# Patient Record
Sex: FEMALE | URBAN - METROPOLITAN AREA
[De-identification: names, ages, dates, MRNs, and addresses within clinical notes are randomized per-mention and may not be internally consistent; named-entity substitution may affect disease eponyms.]

---

## 2021-04-29 ENCOUNTER — OFFICE VISIT (OUTPATIENT)
Dept: URBAN - METROPOLITAN AREA CLINIC 91 | Facility: CLINIC | Age: 46
End: 2021-04-29
Payer: COMMERCIAL

## 2021-04-29 DIAGNOSIS — H11.153 PINGUECULA, BILATERAL: Primary | ICD-10-CM

## 2021-04-29 DIAGNOSIS — H52.03 HYPERMETROPIA, BILATERAL: ICD-10-CM

## 2021-04-29 PROCEDURE — 99202 OFFICE O/P NEW SF 15 MIN: CPT | Performed by: SPECIALIST

## 2021-04-29 ASSESSMENT — INTRAOCULAR PRESSURE
OD: 15
OS: 15

## 2021-04-29 ASSESSMENT — VISUAL ACUITY
OS: 20/20
OD: 20/20

## 2021-04-29 NOTE — IMPRESSION/PLAN
Impression: Pinguecula, bilateral: H11.153. Plan: For Pinguecula I have recommended UV Protection when outdoors, either a hat or UV rated sunglasses. I explained to patient that these areas are more easily irritated by wind and sun exposure. Patient should also use a good quality artificial tears as needed for irritation. N o sign of ocular infection or FB.